# Patient Record
Sex: MALE | Race: OTHER | NOT HISPANIC OR LATINO
[De-identification: names, ages, dates, MRNs, and addresses within clinical notes are randomized per-mention and may not be internally consistent; named-entity substitution may affect disease eponyms.]

---

## 2021-10-21 PROBLEM — Z00.00 ENCOUNTER FOR PREVENTIVE HEALTH EXAMINATION: Status: ACTIVE | Noted: 2021-10-21

## 2021-11-30 ENCOUNTER — APPOINTMENT (OUTPATIENT)
Dept: UROLOGY | Facility: CLINIC | Age: 46
End: 2021-11-30
Payer: COMMERCIAL

## 2021-11-30 VITALS
WEIGHT: 207 LBS | HEART RATE: 77 BPM | SYSTOLIC BLOOD PRESSURE: 137 MMHG | BODY MASS INDEX: 30.66 KG/M2 | HEIGHT: 69 IN | DIASTOLIC BLOOD PRESSURE: 83 MMHG

## 2021-11-30 DIAGNOSIS — Z78.9 OTHER SPECIFIED HEALTH STATUS: ICD-10-CM

## 2021-11-30 DIAGNOSIS — Z82.49 FAMILY HISTORY OF ISCHEMIC HEART DISEASE AND OTHER DISEASES OF THE CIRCULATORY SYSTEM: ICD-10-CM

## 2021-11-30 DIAGNOSIS — Z83.3 FAMILY HISTORY OF DIABETES MELLITUS: ICD-10-CM

## 2021-11-30 DIAGNOSIS — Z80.3 FAMILY HISTORY OF MALIGNANT NEOPLASM OF BREAST: ICD-10-CM

## 2021-11-30 DIAGNOSIS — E78.00 PURE HYPERCHOLESTEROLEMIA, UNSPECIFIED: ICD-10-CM

## 2021-11-30 DIAGNOSIS — Z87.891 PERSONAL HISTORY OF NICOTINE DEPENDENCE: ICD-10-CM

## 2021-11-30 PROCEDURE — 99204 OFFICE O/P NEW MOD 45 MIN: CPT

## 2021-11-30 RX ORDER — CLINDAMYCIN HYDROCHLORIDE 300 MG/1
300 CAPSULE ORAL
Qty: 40 | Refills: 0 | Status: COMPLETED | COMMUNITY
Start: 2021-05-30

## 2021-11-30 RX ORDER — FINASTERIDE 5 MG/1
5 TABLET, FILM COATED ORAL
Qty: 30 | Refills: 0 | Status: DISCONTINUED | COMMUNITY
Start: 2021-10-20

## 2021-11-30 RX ORDER — CLOBETASOL PROPIONATE 0.5 MG/G
0.05 CREAM TOPICAL
Qty: 30 | Refills: 0 | Status: COMPLETED | COMMUNITY
Start: 2021-10-20

## 2021-11-30 RX ORDER — BETAMETHASONE DIPROPIONATE 0.5 MG/G
0.05 CREAM, AUGMENTED TOPICAL
Qty: 50 | Refills: 0 | Status: COMPLETED | COMMUNITY
Start: 2021-11-03

## 2021-11-30 RX ORDER — CIPROFLOXACIN HYDROCHLORIDE 500 MG/1
500 TABLET, FILM COATED ORAL
Qty: 20 | Refills: 0 | Status: COMPLETED | COMMUNITY
Start: 2021-10-02

## 2021-11-30 NOTE — ASSESSMENT
[FreeTextEntry1] : His symptoms are more than I expect from the size of his prostate but his quality of life is better than I expect from his symptoms.  I like to get some baseline studies including a record of his intake and output if no other reason then he tells me he is done often at night and that may be subjective behavior modification.  I do not have a urine culture so we will send urine for analysis and culture and he will come back and we will review him probably get a flow study.  Depending on what I find I will probably recommend just stay the course he is happy the drug has great tolerability and the side effect profile is not bothering him\par \par As far as Proscar with a 34 g prostate especially with the risk of post Proscar syndrome and the fact that his quality of life is a 1 probably hold off\par \par As far as the left upper pole septated cyst we will get another ultrasound in 6 months and see if anything change

## 2021-11-30 NOTE — HISTORY OF PRESENT ILLNESS
[Urinary Urgency] : urinary urgency [Urinary Frequency] : urinary frequency [Nocturia] : nocturia [Straining] : straining [Weak Stream] : weak stream [Intermittency] : intermittency [None] : None [FreeTextEntry1] : Rian is a 46-year-old male born November 27, 1975 who over the summer started noticing increasing trouble with urination.  Symptoms at some point got quite severe he went to his PCP.  He was started on Flomax, they told him to stop the medication to get some seasonal allergies and because they thought there was some bacteria in his urine gave him Cipro.  He tells me in retrospect there were no positive cultures but he finished the Cipro anyway what it was for Cipro in conjunction with just the stopping of the antihistamine in conjunction with the alpha blockade his urination improved.  If not normal that he still has symptoms of\par Incomplete emptying–3\par Frequency–1\par Intermittency–4\par Urgency–2\par Slow stream–2\par Straining–1\par Nocturia x2 giving him an AUA symptom score 15/2/1.  He currently has no burning no blood and that would rather sleep through the night in the past when he gets to work he has to run to the bathroom now if he has things to take care of a room for an hour or 2 he is able to hold down.\par \par He has no trouble with erectile function and the Flomax that would decrease his ejaculation somewhat he still able to have ejaculation and orgasm is not affected\par \par His PCP had given him Proscar but when he reviewed the side effect profile especially that of post Proscar syndrome he decided he was not going to take it. [Erectile Dysfunction] : no Erectile Dysfunction [Dysuria] : no dysuria [Hematuria - Gross] : no gross hematuria [Hematuria - Microscopic] : no microscopic hematuria

## 2021-11-30 NOTE — PHYSICAL EXAM
[General Appearance - Well Developed] : well developed [General Appearance - Well Nourished] : well nourished [Normal Appearance] : normal appearance [Well Groomed] : well groomed [General Appearance - In No Acute Distress] : no acute distress [Heart Rate And Rhythm] : Heart rate and rhythm were normal [Edema] : no peripheral edema [Respiration, Rhythm And Depth] : normal respiratory rhythm and effort [Exaggerated Use Of Accessory Muscles For Inspiration] : no accessory muscle use [Auscultation Breath Sounds / Voice Sounds] : lungs were clear to auscultation bilaterally [Abdomen Soft] : soft [Abdomen Tenderness] : non-tender [Abdomen Hernia] : no hernia was discovered [Costovertebral Angle Tenderness] : no ~M costovertebral angle tenderness [Urethral Meatus] : meatus normal [Penis Abnormality] : normal circumcised penis [Epididymis] : the epididymides were normal [Testes Tenderness] : no tenderness of the testes [Testes Mass (___cm)] : there were no testicular masses [Anus Abnormality] : the anus and perineum were normal [Rectal Exam - Rectum] : digital rectal exam was normal [Prostate Enlargement] : the prostate was not enlarged [Prostate Tenderness] : the prostate was not tender [No Prostate Nodules] : no prostate nodules [Prostate Size ___ gm] : prostate size [unfilled] gm [Normal Station and Gait] : the gait and station were normal for the patient's age [] : no rash [Oriented To Time, Place, And Person] : oriented to person, place, and time [Affect] : the affect was normal [Mood] : the mood was normal [Not Anxious] : not anxious [Inguinal Lymph Nodes Enlarged Bilaterally] : inguinal [FreeTextEntry1] : DTR's & BC reflexes were intact

## 2021-11-30 NOTE — LETTER HEADER
[FreeTextEntry3] : Silvia Schmid M.D.\par Director Emeritus of Urology\par [facility]\par [address] [place] [address], [address]\[place] [address]

## 2021-11-30 NOTE — LETTER BODY
[Consult Letter:] : I had the pleasure of evaluating your patient, [unfilled]. [Please see my note below.] : Please see my note below. [Consult Closing:] : Thank you very much for allowing me to participate in the care of this patient.  If you have any questions, please do not hesitate to contact me. [Sincerely,] : Sincerely, [Dear  ___] : Dear  [unfilled], [FreeTextEntry2] : Roman Moyer MD\par [facility] [address]\[place] [address]

## 2021-12-02 LAB
APPEARANCE: CLEAR
BACTERIA UR CULT: NORMAL
BILIRUBIN URINE: NEGATIVE
BLOOD URINE: NEGATIVE
COLOR: YELLOW
GLUCOSE QUALITATIVE U: NEGATIVE
KETONES URINE: NEGATIVE
LEUKOCYTE ESTERASE URINE: NEGATIVE
NITRITE URINE: NEGATIVE
PH URINE: 5.5
PROTEIN URINE: NORMAL
SPECIFIC GRAVITY URINE: 1.02
UROBILINOGEN URINE: NORMAL

## 2022-01-20 ENCOUNTER — APPOINTMENT (OUTPATIENT)
Dept: UROLOGY | Facility: CLINIC | Age: 47
End: 2022-01-20
Payer: COMMERCIAL

## 2022-01-20 VITALS
BODY MASS INDEX: 30.66 KG/M2 | DIASTOLIC BLOOD PRESSURE: 86 MMHG | HEART RATE: 85 BPM | SYSTOLIC BLOOD PRESSURE: 133 MMHG | HEIGHT: 69 IN | WEIGHT: 207 LBS

## 2022-01-20 PROCEDURE — 51798 US URINE CAPACITY MEASURE: CPT

## 2022-01-20 PROCEDURE — 99214 OFFICE O/P EST MOD 30 MIN: CPT | Mod: 25

## 2022-01-20 PROCEDURE — 51741 ELECTRO-UROFLOWMETRY FIRST: CPT

## 2022-01-20 NOTE — ASSESSMENT
[FreeTextEntry1] : His voiding diary demonstrates some small volume voiding.  His uroflow study demonstrates prolonged voiding however, he is emptying.  He states he is satisfied with the way he is voiding on tamsulosin does not wish for anything else at this time.  For now we will continue tamsulosin p.o. daily.  Follow-up in April for renal ultrasound follow-up for his renal cyst.

## 2022-01-20 NOTE — END OF VISIT
[FreeTextEntry3] : I, Dr. Schmid, personally performed the evaluation and management (E/M) services for this established patient who presents today with (a) new problem(s)/exacerbation of (an) existing condition(s).  That E/M includes conducting the examination, assessing all new/exacerbated conditions, and establishing a new plan of care.  Today, my ACP, Jakob Burroughs was here to observe my evaluation and management services for this new problem/exacerbated condition to be followed going forward.  [Time Spent: ___ minutes] : I have spent [unfilled] minutes of time on the encounter. [>50% of the face to face encounter time was spent on counseling and/or coordination of care for ___] : Greater than 50% of the face to face encounter time was spent on counseling and/or coordination of care for [unfilled]

## 2022-01-20 NOTE — LETTER BODY
[Dear  ___] : Dear  [unfilled], [Consult Letter:] : I had the pleasure of evaluating your patient, [unfilled]. [Please see my note below.] : Please see my note below. [Consult Closing:] : Thank you very much for allowing me to participate in the care of this patient.  If you have any questions, please do not hesitate to contact me. [Sincerely,] : Sincerely, [FreeTextEntry2] : Roman Moyer MD\par [facility] [address]\[place] [address]

## 2022-01-20 NOTE — HISTORY OF PRESENT ILLNESS
[Urinary Urgency] : urinary urgency [Urinary Frequency] : urinary frequency [Nocturia] : nocturia [Straining] : straining [Weak Stream] : weak stream [Intermittency] : intermittency [None] : None [FreeTextEntry1] : Rian is a 46-year-old male who we have been following for bothersome lower urinary tract symptoms and renal cyst.  He was first seen on November 30, 2021\par \edwige Currently is managed on tamsulosin p.o. daily and reports significant improvement in his voiding.  He states he is voiding acceptably now.\edwige \edwige Presents today to review his uroflow study and voiding diary\par \par  [Erectile Dysfunction] : no Erectile Dysfunction [Dysuria] : no dysuria [Hematuria - Gross] : no gross hematuria [Hematuria - Microscopic] : no microscopic hematuria

## 2022-01-20 NOTE — LETTER HEADER
[FreeTextEntry3] : Silvia Schmid M.D.\par Director Emeritus of Urology\par [facility]\par [address] [place] [address], [address]\[place] [address]

## 2022-04-08 ENCOUNTER — APPOINTMENT (OUTPATIENT)
Dept: ULTRASOUND IMAGING | Facility: CLINIC | Age: 47
End: 2022-04-08
Payer: COMMERCIAL

## 2022-04-08 ENCOUNTER — OUTPATIENT (OUTPATIENT)
Dept: OUTPATIENT SERVICES | Facility: HOSPITAL | Age: 47
LOS: 1 days | End: 2022-04-08

## 2022-04-08 PROCEDURE — 76775 US EXAM ABDO BACK WALL LIM: CPT | Mod: 26

## 2022-04-18 ENCOUNTER — APPOINTMENT (OUTPATIENT)
Dept: UROLOGY | Facility: CLINIC | Age: 47
End: 2022-04-18
Payer: COMMERCIAL

## 2022-04-18 VITALS
DIASTOLIC BLOOD PRESSURE: 80 MMHG | WEIGHT: 205 LBS | HEIGHT: 69 IN | SYSTOLIC BLOOD PRESSURE: 114 MMHG | BODY MASS INDEX: 30.36 KG/M2 | HEART RATE: 71 BPM

## 2022-04-18 PROCEDURE — 99213 OFFICE O/P EST LOW 20 MIN: CPT

## 2022-04-18 NOTE — HISTORY OF PRESENT ILLNESS
[Urinary Urgency] : urinary urgency [Urinary Frequency] : urinary frequency [Nocturia] : nocturia [Straining] : straining [Weak Stream] : weak stream [Intermittency] : intermittency [None] : None [FreeTextEntry1] : Rian is a 46-year-old male who we have been following for bothersome lower urinary tract symptoms and renal cyst. \par \par Currently he is managed on tamsulosin p.o. daily with good results and without adverse events.  He is happy with the way he is voiding.\par \par Presents today to review his renal/bladder ultrasound as a history of a complex renal cyst.\par  [Erectile Dysfunction] : no Erectile Dysfunction [Dysuria] : no dysuria [Hematuria - Gross] : no gross hematuria [Hematuria - Microscopic] : no microscopic hematuria

## 2022-04-18 NOTE — LETTER HEADER
[FreeTextEntry3] : Silvia Schmid M.D.\par Director Emeritus of Urology\par [facility]\par [address] [place] [address], [address]\[place] [address]

## 2022-04-18 NOTE — LETTER BODY
[Dear  ___] : Dear  [unfilled], [Please see my note below.] : Please see my note below. [Consult Closing:] : Thank you very much for allowing me to participate in the care of this patient.  If you have any questions, please do not hesitate to contact me. [Sincerely,] : Sincerely, [Courtesy Letter:] : I had the pleasure of seeing your patient, [unfilled], in my office today. [FreeTextEntry2] : Roman Moyer MD\par [facility] [address]\[place] [address]

## 2022-04-18 NOTE — ASSESSMENT
[FreeTextEntry1] : His ultrasound demonstrates stable Bosniak 2 cyst, no need for further investigation.\par \par Concerning his urination he is well managed on tamsulosin p.o. daily and would not consider any other intervention.\par \par Follow-up in 6 months for symptom index.  If the situation changes he will call him and will come in sooner

## 2022-04-18 NOTE — END OF VISIT
[FreeTextEntry3] : I, Dr. Schmid, personally performed the evaluation and management (E/M) services for this established patient who presents today with (a) new problem(s)/exacerbation of (an) existing condition(s).  That E/M includes conducting the examination, assessing all new/exacerbated conditions, and establishing a new plan of care.  Today, my ACP, Jakob Burroughs was here to observe my evaluation and management services for this new problem/exacerbated condition to be followed going forward.

## 2022-10-19 ENCOUNTER — APPOINTMENT (OUTPATIENT)
Dept: UROLOGY | Facility: CLINIC | Age: 47
End: 2022-10-19

## 2022-10-19 VITALS
BODY MASS INDEX: 29.62 KG/M2 | HEIGHT: 69 IN | SYSTOLIC BLOOD PRESSURE: 121 MMHG | DIASTOLIC BLOOD PRESSURE: 80 MMHG | WEIGHT: 200 LBS | RESPIRATION RATE: 16 BRPM | HEART RATE: 84 BPM

## 2022-10-19 DIAGNOSIS — N28.1 CYST OF KIDNEY, ACQUIRED: ICD-10-CM

## 2022-10-19 PROCEDURE — 99213 OFFICE O/P EST LOW 20 MIN: CPT

## 2022-10-19 RX ORDER — BENZONATATE 100 MG/1
100 CAPSULE ORAL
Qty: 30 | Refills: 0 | Status: COMPLETED | COMMUNITY
Start: 2022-05-26

## 2022-10-19 NOTE — LETTER HEADER
[FreeTextEntry3] : Silvia Schmid M.D.\par Director Emeritus of Urology\par [facility]\par [address] [place] [address], [address]\[place] [address]

## 2022-10-19 NOTE — HISTORY OF PRESENT ILLNESS
[FreeTextEntry1] : Rian is a 46-year-old male born November 27, 1975 last seen on April 18, 2022 for voiding dysfunction.  We are managing him with tamsulosin using 0.4 mg 30 minutes after the same meal each day, last seen he was doing well without issues.  He has a history of a complex renal cyst on the last ultrasound on April 8, 2022 rated as a Bosniak 2 cyst without the need for further investigation.  He comes in today telling me that as a totality he continues to do well.\par \par There was 1 week we are in there was a lot of tension in voiding did not go was well but when he went back to his baseline he went back to voiding comfortably [Urinary Urgency] : urinary urgency [Nocturia] : nocturia [Urinary Frequency] : urinary frequency [Straining] : straining [Weak Stream] : weak stream [Intermittency] : intermittency [Erectile Dysfunction] : no Erectile Dysfunction [Dysuria] : no dysuria [Hematuria - Gross] : no gross hematuria [Hematuria - Microscopic] : no microscopic hematuria [None] : None

## 2022-10-19 NOTE — LETTER BODY
[Dear  ___] : Dear  [unfilled], [Courtesy Letter:] : I had the pleasure of seeing your patient, [unfilled], in my office today. [Please see my note below.] : Please see my note below. [Sincerely,] : Sincerely, [FreeTextEntry2] : Roman Moyer MD\par [facility] [address]\[place] [address]

## 2022-10-19 NOTE — ASSESSMENT
[FreeTextEntry1] : His voiding is at the tablet he is stable enough that he does not feel he needs anything other than the medication.  The sex life is to a certain extent on hold as his wife was just diagnosed with breast cancer had a lumpectomy with an axillary dissection which unfortunately was positive she will be starting they believe chemo is not hormonal therapy.  Urologically there is nothing more that we can do at present he will stay the course we will see him again in 6 months with a PSA before he comes back in

## 2022-10-19 NOTE — PHYSICAL EXAM
[General Appearance - Well Developed] : well developed [General Appearance - Well Nourished] : well nourished [Normal Appearance] : normal appearance [Well Groomed] : well groomed [General Appearance - In No Acute Distress] : no acute distress [FreeTextEntry1] : BMI of 31 [] : no respiratory distress [Respiration, Rhythm And Depth] : normal respiratory rhythm and effort [Exaggerated Use Of Accessory Muscles For Inspiration] : no accessory muscle use [Oriented To Time, Place, And Person] : oriented to person, place, and time [Affect] : the affect was normal [Mood] : the mood was normal [Not Anxious] : not anxious [Normal Station and Gait] : the gait and station were normal for the patient's age

## 2022-10-20 LAB
APPEARANCE: CLEAR
BILIRUBIN URINE: NEGATIVE
BLOOD URINE: NEGATIVE
COLOR: YELLOW
GLUCOSE QUALITATIVE U: NEGATIVE
KETONES URINE: NEGATIVE
LEUKOCYTE ESTERASE URINE: NEGATIVE
NITRITE URINE: NEGATIVE
PH URINE: 5.5
PROTEIN URINE: NORMAL
SPECIFIC GRAVITY URINE: 1.04
UROBILINOGEN URINE: NORMAL

## 2022-10-21 LAB — BACTERIA UR CULT: NORMAL

## 2023-11-02 ENCOUNTER — RX RENEWAL (OUTPATIENT)
Age: 48
End: 2023-11-02

## 2023-11-10 ENCOUNTER — APPOINTMENT (OUTPATIENT)
Dept: UROLOGY | Facility: CLINIC | Age: 48
End: 2023-11-10
Payer: COMMERCIAL

## 2023-11-10 VITALS
HEIGHT: 69 IN | DIASTOLIC BLOOD PRESSURE: 87 MMHG | WEIGHT: 200 LBS | SYSTOLIC BLOOD PRESSURE: 132 MMHG | HEART RATE: 88 BPM | BODY MASS INDEX: 29.62 KG/M2

## 2023-11-10 DIAGNOSIS — R39.198 OTHER DIFFICULTIES WITH MICTURITION: ICD-10-CM

## 2023-11-10 DIAGNOSIS — R39.13 SPLITTING OF URINARY STREAM: ICD-10-CM

## 2023-11-10 DIAGNOSIS — R39.15 URGENCY OF URINATION: ICD-10-CM

## 2023-11-10 DIAGNOSIS — R30.0 DYSURIA: ICD-10-CM

## 2023-11-10 DIAGNOSIS — R35.0 FREQUENCY OF MICTURITION: ICD-10-CM

## 2023-11-10 DIAGNOSIS — R33.9 RETENTION OF URINE, UNSPECIFIED: ICD-10-CM

## 2023-11-10 DIAGNOSIS — R35.1 NOCTURIA: ICD-10-CM

## 2023-11-10 DIAGNOSIS — N40.0 BENIGN PROSTATIC HYPERPLASIA WITHOUT LOWER URINARY TRACT SYMPMS: ICD-10-CM

## 2023-11-10 PROCEDURE — 99214 OFFICE O/P EST MOD 30 MIN: CPT

## 2023-11-10 RX ORDER — TAMSULOSIN HYDROCHLORIDE 0.4 MG/1
0.4 CAPSULE ORAL
Qty: 90 | Refills: 3 | Status: ACTIVE | COMMUNITY
Start: 2021-11-23 | End: 1900-01-01

## 2023-11-15 LAB
APPEARANCE: CLEAR
BACTERIA UR CULT: NORMAL
BILIRUBIN URINE: NEGATIVE
BLOOD URINE: NEGATIVE
COLOR: YELLOW
GLUCOSE QUALITATIVE U: NEGATIVE MG/DL
KETONES URINE: NEGATIVE MG/DL
LEUKOCYTE ESTERASE URINE: NEGATIVE
NITRITE URINE: NEGATIVE
PH URINE: 5.5
PROTEIN URINE: NEGATIVE MG/DL
SPECIFIC GRAVITY URINE: 1.02
UROBILINOGEN URINE: 0.2 MG/DL

## 2024-09-04 ENCOUNTER — RX RENEWAL (OUTPATIENT)
Age: 49
End: 2024-09-04

## 2024-10-23 ENCOUNTER — OUTPATIENT (OUTPATIENT)
Dept: OUTPATIENT SERVICES | Facility: HOSPITAL | Age: 49
LOS: 1 days | End: 2024-10-23
Payer: COMMERCIAL

## 2024-10-23 ENCOUNTER — RESULT REVIEW (OUTPATIENT)
Age: 49
End: 2024-10-23

## 2024-10-23 DIAGNOSIS — N40.0 BENIGN PROSTATIC HYPERPLASIA WITHOUT LOWER URINARY TRACT SYMPTOMS: ICD-10-CM

## 2024-10-23 DIAGNOSIS — Z00.8 ENCOUNTER FOR OTHER GENERAL EXAMINATION: ICD-10-CM

## 2024-10-23 PROCEDURE — 76770 US EXAM ABDO BACK WALL COMP: CPT | Mod: 26

## 2024-10-23 PROCEDURE — 76770 US EXAM ABDO BACK WALL COMP: CPT

## 2024-10-24 DIAGNOSIS — N40.0 BENIGN PROSTATIC HYPERPLASIA WITHOUT LOWER URINARY TRACT SYMPTOMS: ICD-10-CM

## 2024-11-14 ENCOUNTER — NON-APPOINTMENT (OUTPATIENT)
Age: 49
End: 2024-11-14

## 2024-11-14 ENCOUNTER — APPOINTMENT (OUTPATIENT)
Dept: UROLOGY | Facility: CLINIC | Age: 49
End: 2024-11-14
Payer: COMMERCIAL

## 2024-11-14 VITALS
DIASTOLIC BLOOD PRESSURE: 82 MMHG | SYSTOLIC BLOOD PRESSURE: 137 MMHG | TEMPERATURE: 98 F | OXYGEN SATURATION: 95 % | RESPIRATION RATE: 17 BRPM | WEIGHT: 204 LBS | BODY MASS INDEX: 30.21 KG/M2 | HEART RATE: 86 BPM | HEIGHT: 69 IN

## 2024-11-14 DIAGNOSIS — N40.0 BENIGN PROSTATIC HYPERPLASIA WITHOUT LOWER URINARY TRACT SYMPMS: ICD-10-CM

## 2024-11-14 DIAGNOSIS — R39.198 OTHER DIFFICULTIES WITH MICTURITION: ICD-10-CM

## 2024-11-14 DIAGNOSIS — R33.9 RETENTION OF URINE, UNSPECIFIED: ICD-10-CM

## 2024-11-14 DIAGNOSIS — N28.1 CYST OF KIDNEY, ACQUIRED: ICD-10-CM

## 2024-11-14 DIAGNOSIS — R39.15 URGENCY OF URINATION: ICD-10-CM

## 2024-11-14 PROCEDURE — G2211 COMPLEX E/M VISIT ADD ON: CPT | Mod: NC

## 2024-11-14 PROCEDURE — 99213 OFFICE O/P EST LOW 20 MIN: CPT

## 2025-02-13 ENCOUNTER — RX RENEWAL (OUTPATIENT)
Age: 50
End: 2025-02-13